# Patient Record
Sex: FEMALE | Race: WHITE | NOT HISPANIC OR LATINO | URBAN - METROPOLITAN AREA
[De-identification: names, ages, dates, MRNs, and addresses within clinical notes are randomized per-mention and may not be internally consistent; named-entity substitution may affect disease eponyms.]

---

## 2017-01-03 ENCOUNTER — OUTPATIENT (OUTPATIENT)
Dept: OUTPATIENT SERVICES | Facility: HOSPITAL | Age: 52
LOS: 1 days | Discharge: HOME | End: 2017-01-03

## 2017-06-27 DIAGNOSIS — R31.9 HEMATURIA, UNSPECIFIED: ICD-10-CM

## 2018-03-12 ENCOUNTER — LABORATORY RESULT (OUTPATIENT)
Age: 53
End: 2018-03-12

## 2018-03-13 ENCOUNTER — APPOINTMENT (OUTPATIENT)
Dept: OBGYN | Facility: CLINIC | Age: 53
End: 2018-03-13
Payer: COMMERCIAL

## 2018-03-13 ENCOUNTER — OUTPATIENT (OUTPATIENT)
Dept: OUTPATIENT SERVICES | Facility: HOSPITAL | Age: 53
LOS: 1 days | Discharge: HOME | End: 2018-03-13

## 2018-03-13 VITALS — HEIGHT: 60 IN | WEIGHT: 165 LBS | BODY MASS INDEX: 32.39 KG/M2

## 2018-03-13 DIAGNOSIS — N63.20 UNSPECIFIED LUMP IN THE LEFT BREAST, UNSPECIFIED QUADRANT: ICD-10-CM

## 2018-03-13 DIAGNOSIS — N95.1 MENOPAUSAL AND FEMALE CLIMACTERIC STATES: ICD-10-CM

## 2018-03-13 DIAGNOSIS — Z01.411 ENCOUNTER FOR GYNECOLOGICAL EXAMINATION (GENERAL) (ROUTINE) WITH ABNORMAL FINDINGS: ICD-10-CM

## 2018-03-13 PROBLEM — Z00.00 ENCOUNTER FOR PREVENTIVE HEALTH EXAMINATION: Status: ACTIVE | Noted: 2018-03-13

## 2018-03-13 LAB
BILIRUB UR QL STRIP: NORMAL
GLUCOSE UR-MCNC: NORMAL
HCG UR QL: NORMAL EU/DL
HGB UR QL STRIP.AUTO: 250
KETONES UR-MCNC: NORMAL
LEUKOCYTE ESTERASE UR QL STRIP: NORMAL
NITRITE UR QL STRIP: NORMAL
PH UR STRIP: 5
PROT UR STRIP-MCNC: NORMAL
SP GR UR STRIP: 1.01

## 2018-03-13 PROCEDURE — 99396 PREV VISIT EST AGE 40-64: CPT

## 2018-03-13 PROCEDURE — 81003 URINALYSIS AUTO W/O SCOPE: CPT | Mod: QW

## 2018-03-18 ENCOUNTER — FORM ENCOUNTER (OUTPATIENT)
Age: 53
End: 2018-03-18

## 2018-03-19 ENCOUNTER — OUTPATIENT (OUTPATIENT)
Dept: OUTPATIENT SERVICES | Facility: HOSPITAL | Age: 53
LOS: 1 days | Discharge: HOME | End: 2018-03-19

## 2018-03-19 DIAGNOSIS — N63.20 UNSPECIFIED LUMP IN THE LEFT BREAST, UNSPECIFIED QUADRANT: ICD-10-CM

## 2018-04-07 ENCOUNTER — APPOINTMENT (OUTPATIENT)
Dept: OBGYN | Facility: CLINIC | Age: 53
End: 2018-04-07
Payer: COMMERCIAL

## 2018-04-07 PROCEDURE — 76856 US EXAM PELVIC COMPLETE: CPT

## 2018-04-07 PROCEDURE — 76830 TRANSVAGINAL US NON-OB: CPT

## 2018-04-07 PROCEDURE — 77085 DXA BONE DENSITY AXL VRT FX: CPT

## 2019-04-05 ENCOUNTER — FORM ENCOUNTER (OUTPATIENT)
Age: 54
End: 2019-04-05

## 2019-04-06 ENCOUNTER — OUTPATIENT (OUTPATIENT)
Dept: OUTPATIENT SERVICES | Facility: HOSPITAL | Age: 54
LOS: 1 days | Discharge: HOME | End: 2019-04-06
Payer: COMMERCIAL

## 2019-04-06 DIAGNOSIS — Z12.31 ENCOUNTER FOR SCREENING MAMMOGRAM FOR MALIGNANT NEOPLASM OF BREAST: ICD-10-CM

## 2019-04-06 PROCEDURE — 77063 BREAST TOMOSYNTHESIS BI: CPT | Mod: 26

## 2019-04-06 PROCEDURE — 77067 SCR MAMMO BI INCL CAD: CPT | Mod: 26

## 2021-04-12 ENCOUNTER — TRANSCRIPTION ENCOUNTER (OUTPATIENT)
Age: 56
End: 2021-04-12

## 2021-04-18 ENCOUNTER — TRANSCRIPTION ENCOUNTER (OUTPATIENT)
Age: 56
End: 2021-04-18

## 2022-06-27 ENCOUNTER — APPOINTMENT (OUTPATIENT)
Dept: PAIN MANAGEMENT | Facility: CLINIC | Age: 57
End: 2022-06-27

## 2023-01-03 ENCOUNTER — LABORATORY RESULT (OUTPATIENT)
Age: 58
End: 2023-01-03

## 2023-01-03 ENCOUNTER — APPOINTMENT (OUTPATIENT)
Dept: PAIN MANAGEMENT | Facility: CLINIC | Age: 58
End: 2023-01-03
Payer: COMMERCIAL

## 2023-01-03 VITALS
WEIGHT: 165 LBS | SYSTOLIC BLOOD PRESSURE: 165 MMHG | HEART RATE: 72 BPM | DIASTOLIC BLOOD PRESSURE: 99 MMHG | BODY MASS INDEX: 32.39 KG/M2 | HEIGHT: 60 IN

## 2023-01-03 LAB — PM MDA: NEGATIVE

## 2023-01-03 PROCEDURE — 99214 OFFICE O/P EST MOD 30 MIN: CPT

## 2023-01-03 PROCEDURE — 80305 DRUG TEST PRSMV DIR OPT OBS: CPT | Mod: QW

## 2023-01-03 NOTE — PHYSICAL EXAM
[de-identified] : \par 1) Spine - Left facet loading, tenderness over L3-S1 facet with pain on extension 5 degrees.\par \par 2) Neck- Pain on flexion and extension with bilateral paravertebral tenderness at C3 through C6. \par \par 3) RUE- Inspection and palpation shows no misalignment, asymmetry, crepitation, defects, tenderness, masses, effusions. ROM is normal without crepitation or contracture. No instability or subluxation or laxity is noted. No abnormal movements. \par \par 4) LUE- Inspection and palpation shows no misalignment, asymmetry, crepitation, defects, tenderness, masses, effusions . ROM is normal without crepitation or contracture. No instability or subluxation or laxity is noted. No abnormal movements. \par \par 5) RLE- Inspection and palpation shows no misalignment, asymmetry, crepitation, defects, tenderness, masses, effusions. ROM is normal without crepitation or contracture. No instability or subluxation or laxity is noted. No abnormal movements. \par \par 6) LLE- Inspection and palpation shows no misalignment, asymmetry, crepitation, defects, tenderness, masses, effusions. ROM is normal without crepitation or contracture. No instability or subluxation or laxity is noted. No abnormal movements.  \par \par Right Shoulder: Palpation of the shoulder/upper arm is as follows: Tenderness is noted at the AC joint, anterior shoulder, lateral shoulder and posterior shoulder. Range of motion of the shoulder is as follows: Pain with internal rotation and external rotation. \par
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
## 2023-01-03 NOTE — ASSESSMENT
[FreeTextEntry1] : Ms. Nikki Escalante is a 58 yo F who suffers from chronic pain involving the cervical and lumbar regions for which she maintains pain control through the continued use of medial marijuana. Overall there is at least a 30-50% reduction in pain with the prescribed analgesics. The patient denies any adverse side effects due to the medication (sleeping disturbance, constipation, sleepiness, hallucinations and/or urination problems).  She is to continue with the given regimen and has been encouraged to obtain marijuana through the approved channels, for example through the locally approved dispensaries. We will not issue a medical marijuana license for recreational use.\par \par UDS to be completed today. \par \par I have consulted the  registry for the purpose of reviewing the patients controlled substance\par \par I, Ria Arteaga, attest that this documentation has been prepared under the direction and in the presence of Provider Kate Sierra The documentation recorded by the scribe, in my presence, accurately reflects the service I personally performed, and the decisions made by me with my edits as appropriate.\par \par Best Regards, \par Antonio Miles, D.O. \par Diplomat, American Board of Anesthesiology \par Diplomat, American Board of Pain Medicine \par Diplomat, American Board of Pain Management

## 2023-05-08 ENCOUNTER — LABORATORY RESULT (OUTPATIENT)
Age: 58
End: 2023-05-08

## 2023-05-09 ENCOUNTER — APPOINTMENT (OUTPATIENT)
Dept: PAIN MANAGEMENT | Facility: CLINIC | Age: 58
End: 2023-05-09
Payer: OTHER GOVERNMENT

## 2023-05-09 ENCOUNTER — RESULT CHARGE (OUTPATIENT)
Age: 58
End: 2023-05-09

## 2023-05-09 VITALS — WEIGHT: 165 LBS | BODY MASS INDEX: 32.39 KG/M2 | HEIGHT: 60 IN

## 2023-05-09 DIAGNOSIS — Z87.898 PERSONAL HISTORY OF OTHER SPECIFIED CONDITIONS: ICD-10-CM

## 2023-05-09 PROCEDURE — 99214 OFFICE O/P EST MOD 30 MIN: CPT

## 2023-05-09 PROCEDURE — 80305 DRUG TEST PRSMV DIR OPT OBS: CPT | Mod: QW

## 2023-05-11 LAB
AMP / AMPHETAMINE: NEGATIVE
BAR / SECOBARBITAL: NEGATIVE
BUP / BUPRENORPHINE: NEGATIVE
BZO / OXAZEPAM: NEGATIVE
COC / COCAINE: NEGATIVE
CREATININE: 50 MG/DL
MDMA / METHYLENEDIOXYMETHAMPHETAMINE: NEGATIVE
MET / METHAMPHETAMINES: NEGATIVE
MOP / MORPHINE: NEGATIVE
MTD / METHADONE: NEGATIVE
OXY / OXYCODONE: NEGATIVE
PCP / PHENCYCLIDINE: NEGATIVE
PH: 5
SPECIFIC GRAVITY: 1.02
TEMPERATURE: 92 C
THC / MARIJUANA: NEGATIVE
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
## 2023-05-11 NOTE — ASSESSMENT
[FreeTextEntry1] : Ms. Nikki Escalante is a 56 yo F who suffers from chronic pain involving the cervical and lumbar regions for which she maintains pain control through the continued use of medial marijuana. She notes that the medication continues to aid in pain relief and more restful sleep. She notes she uses a cream during the day and takes the pill or vapes at night. She wishes to continue with her current regimen. Overall there is at least a 30-50% reduction in pain with the prescribed analgesics. The patient denies any adverse side effects due to the medication (sleeping disturbance, constipation, sleepiness, hallucinations and/or urination problems).  She is to continue with the given regimen and has been encouraged to obtain marijuana through the approved channels, for example through the locally approved dispensaries. \par \par She continues to have cervical and lumbar radicular symptoms. It has been some time since she has had an updated MRI in the past. She has undergone epidural steroid and RFAs in the past with no sustained relief. Cervical and lumbar MRIs were ordered to delineate spine pathology such as disc displacement and stenosis. \par \par UDS to be completed today. \par \par I have consulted the  registry for the purpose of reviewing the patients controlled substance\par \par I, Ria Arteaga, attest that this documentation has been prepared under the direction and in the presence of Provider Kate Sierra The documentation recorded by the scribe, in my presence, accurately reflects the service I personally performed, and the decisions made by me with my edits as appropriate.\par \par Best Regards, \par Antonio Miles D.RE. \par Diplomat, American Board of Anesthesiology \par Diplomat, American Board of Pain Medicine \par Diplomat, American Board of Pain Management

## 2023-05-11 NOTE — PHYSICAL EXAM
[de-identified] : \par 1) Spine - Left facet loading, tenderness over L3-S1 facet with pain on extension 5 degrees.\par \par 2) Neck- Pain on flexion and extension with bilateral paravertebral tenderness at C3 through C6. \par \par 3) RUE- Inspection and palpation shows no misalignment, asymmetry, crepitation, defects, tenderness, masses, effusions. ROM is normal without crepitation or contracture. No instability or subluxation or laxity is noted. No abnormal movements. \par \par 4) LUE- Inspection and palpation shows no misalignment, asymmetry, crepitation, defects, tenderness, masses, effusions . ROM is normal without crepitation or contracture. No instability or subluxation or laxity is noted. No abnormal movements. \par \par 5) RLE- Inspection and palpation shows no misalignment, asymmetry, crepitation, defects, tenderness, masses, effusions. ROM is normal without crepitation or contracture. No instability or subluxation or laxity is noted. No abnormal movements. \par \par 6) LLE- Inspection and palpation shows no misalignment, asymmetry, crepitation, defects, tenderness, masses, effusions. ROM is normal without crepitation or contracture. No instability or subluxation or laxity is noted. No abnormal movements.  \par \par Right Shoulder: Palpation of the shoulder/upper arm is as follows: Tenderness is noted at the AC joint, anterior shoulder, lateral shoulder and posterior shoulder. Range of motion of the shoulder is as follows: Pain with internal rotation and external rotation. \par

## 2023-05-15 LAB
PM 6 MAM: NEGATIVE NG/ML
PM 7-AMINO-CLONAZ: NEGATIVE NG/ML
PM ALPHA-HYDROXY-ALPRAZOLAM: NEGATIVE NG/ML
PM ALPHA-HYDROXY-MIDAZOLAM: NEGATIVE NG/ML
PM ALPRAZOLAM: NEGATIVE NG/ML
PM AMOBARBITAL: NEGATIVE NG/ML
PM AMPHETAMINE INTERP: NEGATIVE
PM AMPHETAMINE: NEGATIVE NG/ML
PM BARBURATES INTERP: NEGATIVE
PM BEG: NEGATIVE NG/ML
PM BENZODIAZEPINES INTERP: NEGATIVE
PM BUPRENORPHINE INTERP: NEGATIVE
PM BUPRENORPHINE: NEGATIVE NG/ML
PM BUTALBITAL: NEGATIVE NG/ML
PM CLONAZEPAM: NEGATIVE NG/ML
PM COCAINE INTERP: NEGATIVE
PM COCAINE: NEGATIVE NG/ML
PM CODIENE: NEGATIVE NG/ML
PM COTININE: 698 NG/ML
PM DIAZEPAM: NEGATIVE NG/ML
PM DIHYROCODEINE: NEGATIVE NG/ML
PM EDDP: NEGATIVE NG/ML
PM FENTANYL INTERP: NEGATIVE NG/ML
PM FENTANYL: NEGATIVE NG/ML
PM FLUNITRAZEPAM: NEGATIVE NG/ML
PM FLURAZEPAM: NEGATIVE NG/ML
PM HYDROCODONE: NEGATIVE NG/ML
PM HYDROMORPHONE: NEGATIVE NG/ML
PM LORAZEPAM: NEGATIVE NG/ML
PM MARIJUANA (DELTA-9-THC): NEGATIVE NG/ML
PM MARIJUANA INTERP: NEGATIVE
PM MDA: NEGATIVE NG/ML
PM MDEA: NEGATIVE NG/ML
PM MDMA: NEGATIVE NG/ML
PM MEPERIDINE: NEGATIVE NG/ML
PM METHADONE INTERP: NEGATIVE
PM METHADONE: NEGATIVE NG/ML
PM METHAMPHETAMINE: NEGATIVE NG/ML
PM MIDAZOLAM: NEGATIVE NG/ML
PM MORPHINE: NEGATIVE NG/ML
PM NALOXONE: NEGATIVE NG/ML
PM NALTREXONE: NEGATIVE NG/ML
PM NICOTINE INTERP: POSITIVE
PM NORBUPRENORPHINE: NEGATIVE NG/ML
PM NORDIAZEPAM: NEGATIVE NG/ML
PM NORMEPERIDINE: NEGATIVE NG/ML
PM NOROXYCODONE: NEGATIVE NG/ML
PM OPIOID INTERP: NEGATIVE
PM OXAZEPAM: NEGATIVE NG/ML
PM OXXYCODONE INTERP: NEGATIVE
PM OXYCODONE: NEGATIVE NG/ML
PM OXYMORPHONE: NEGATIVE NG/ML
PM PCP: NEGATIVE NG/ML
PM PHENCYCLIDINE INTERP: NEGATIVE
PM PHENOBARBITAL: NEGATIVE NG/ML
PM PPX: NEGATIVE NG/ML
PM PROPOXYPHENE INTERP: NEGATIVE
PM SECOBARBITAL: NEGATIVE NG/ML
PM SUFENTANIL: NEGATIVE NG/ML
PM TAPENTADOL: NEGATIVE NG/ML
PM TEMAZEPAM: NEGATIVE NG/ML
PM TRAMADOL INTERP: NEGATIVE
PM TRAMADOL: NEGATIVE NG/ML

## 2023-07-11 ENCOUNTER — APPOINTMENT (OUTPATIENT)
Dept: PAIN MANAGEMENT | Facility: CLINIC | Age: 58
End: 2023-07-11
Payer: OTHER GOVERNMENT

## 2023-07-11 DIAGNOSIS — E03.9 HYPOTHYROIDISM, UNSPECIFIED: ICD-10-CM

## 2023-07-11 PROCEDURE — 99214 OFFICE O/P EST MOD 30 MIN: CPT
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
## 2023-07-11 NOTE — PHYSICAL EXAM
[de-identified] : \par 1) Spine - Left facet loading, tenderness over L3-S1 facet with pain on extension 5 degrees.\par \par 2) Neck- Pain on flexion and extension with bilateral paravertebral tenderness at C3 through C6. \par \par 3) RUE- Inspection and palpation shows no misalignment, asymmetry, crepitation, defects, tenderness, masses, effusions. ROM is normal without crepitation or contracture. No instability or subluxation or laxity is noted. No abnormal movements. \par \par 4) LUE- Inspection and palpation shows no misalignment, asymmetry, crepitation, defects, tenderness, masses, effusions . ROM is normal without crepitation or contracture. No instability or subluxation or laxity is noted. No abnormal movements. \par \par 5) RLE- Inspection and palpation shows no misalignment, asymmetry, crepitation, defects, tenderness, masses, effusions. ROM is normal without crepitation or contracture. No instability or subluxation or laxity is noted. No abnormal movements. \par \par 6) LLE- Inspection and palpation shows no misalignment, asymmetry, crepitation, defects, tenderness, masses, effusions. ROM is normal without crepitation or contracture. No instability or subluxation or laxity is noted. No abnormal movements.  \par \par Right Shoulder: Palpation of the shoulder/upper arm is as follows: Tenderness is noted at the AC joint, anterior shoulder, lateral shoulder and posterior shoulder. Range of motion of the shoulder is as follows: Pain with internal rotation and external rotation. \par

## 2023-07-11 NOTE — ASSESSMENT
[FreeTextEntry1] : Ms. Nikki Escalante is a 56 yo F who suffers from chronic pain involving the cervical and lumbar regions for which she maintains pain control through the continued use of medial marijuana. She notes that the medication continues to aid in pain relief and more restful sleep. She notes she uses a cream during the day and takes the pill or vapes at night. She wishes to continue with her current regimen. Overall there is at least a 30-50% reduction in pain with the prescribed analgesics. The patient denies any adverse side effects due to the medication (sleeping disturbance, constipation, sleepiness, hallucinations and/or urination problems).  She is to continue with the given regimen and has been encouraged to obtain marijuana through the approved channels, for example through the locally approved dispensaries. \par \par She continues to have cervical and lumbar radicular symptoms. It has been some time since she has had an updated MRI. She has undergone epidural steroid and RFAs in the past with no sustained relief. Cervical and lumbar MRIs were ordered to delineate spine pathology such as disc displacement and stenosis. \par \par UDS from 3/9/23- consistent \par \par I have consulted the  registry for the purpose of reviewing the patients controlled substance\par \par I, Ria Arteaga, attest that this documentation has been prepared under the direction and in the presence of Provider Kate Sierra The documentation recorded by the scribe, in my presence, accurately reflects the service I personally performed, and the decisions made by me with my edits as appropriate.\par \par Best Regards, \par Antonio Miles D.RE. \par Diplomat, American Board of Anesthesiology \par Diplomat, American Board of Pain Medicine \par Diplomat, American Board of Pain Management

## 2023-08-29 ENCOUNTER — APPOINTMENT (OUTPATIENT)
Dept: PAIN MANAGEMENT | Facility: CLINIC | Age: 58
End: 2023-08-29
Payer: OTHER GOVERNMENT

## 2023-08-29 PROCEDURE — 99213 OFFICE O/P EST LOW 20 MIN: CPT

## 2023-08-29 NOTE — ASSESSMENT
[FreeTextEntry1] : Ms. Nikki Escalante is a 58 yo F who suffers from chronic pain involving the cervical and lumbar regions for which she maintains pain control through the continued use of medial marijuana. She notes that the medication continues to aid in pain relief and more restful sleep. She notes she uses a cream during the day and takes the pill or vapes at night. She wishes to continue with her current regimen. Overall there is at least a 30-50% reduction in pain with the prescribed analgesics. The patient denies any adverse side effects due to the medication (sleeping disturbance, constipation, sleepiness, hallucinations and/or urination problems).  She is to continue with the given regimen and has been encouraged to obtain marijuana through the approved channels, for example through the locally approved dispensaries. Her MRI of the lumbar spine was denied again by her insurance.   She had a cervical MRI which was reviewed in detail during today's encounter. Patient had a MRI that shows a radicular component along with pain referred into the upper extremity. Patient has trialed rehab (Home  exercise, physical therapy or chiropractic care) and medications.  I will schedule a cervical epidural steroid  1-3 depending on effectiveness. Risk, benefits, pros and cons of procedure were explained to the patient using models and diagrams and their questions were answered.   The patient has severe anxiety of procedures that necessitates monitored anesthesia care (MAC). The procedure performed will be close to major nerves, arteries, and spinal cord and/or joint structures. Due to the proximity of these structures, we need the patient to be still during the procedure.  With the help of MAC, this will be safely achieved and decrease the risk of any complications.  UDS from 3/9/23- consistent   I have consulted the  registry for the purpose of reviewing the patients controlled substance.  I, Ria Arteaga, attest that this documentation has been prepared under the direction and in the presence of Provider Antonio Miles, DO The documentation recorded by the scribe, in my presence, accurately reflects the service I personally performed, and the decisions made by me with my edits as appropriate.  Best Regards,  LOWELL Case.RE.  Diplomat, American Board of Anesthesiology  Diplomat, American Board of Pain Medicine  Diplomat, American Board of Pain Management

## 2023-08-29 NOTE — PHYSICAL EXAM
[de-identified] : \par  1) Spine - Left facet loading, tenderness over L3-S1 facet with pain on extension 5 degrees.\par  \par  2) Neck- Pain on flexion and extension with bilateral paravertebral tenderness at C3 through C6. \par  \par  3) RUE- Inspection and palpation shows no misalignment, asymmetry, crepitation, defects, tenderness, masses, effusions. ROM is normal without crepitation or contracture. No instability or subluxation or laxity is noted. No abnormal movements. \par  \par  4) LUE- Inspection and palpation shows no misalignment, asymmetry, crepitation, defects, tenderness, masses, effusions . ROM is normal without crepitation or contracture. No instability or subluxation or laxity is noted. No abnormal movements. \par  \par  5) RLE- Inspection and palpation shows no misalignment, asymmetry, crepitation, defects, tenderness, masses, effusions. ROM is normal without crepitation or contracture. No instability or subluxation or laxity is noted. No abnormal movements. \par  \par  6) LLE- Inspection and palpation shows no misalignment, asymmetry, crepitation, defects, tenderness, masses, effusions. ROM is normal without crepitation or contracture. No instability or subluxation or laxity is noted. No abnormal movements.  \par  \par  Right Shoulder: Palpation of the shoulder/upper arm is as follows: Tenderness is noted at the AC joint, anterior shoulder, lateral shoulder and posterior shoulder. Range of motion of the shoulder is as follows: Pain with internal rotation and external rotation. \par
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
## 2023-09-18 ENCOUNTER — APPOINTMENT (OUTPATIENT)
Dept: PAIN MANAGEMENT | Facility: CLINIC | Age: 58
End: 2023-09-18
Payer: MEDICAID

## 2023-09-18 PROCEDURE — 93770 DETERMINATION VENOUS PRESS: CPT

## 2023-09-18 PROCEDURE — 00600 ANES PX CRV SPINE&CORD NOS: CPT | Mod: QZ,P2

## 2023-09-18 PROCEDURE — 93040 RHYTHM ECG WITH REPORT: CPT | Mod: 79

## 2023-09-18 PROCEDURE — 94761 N-INVAS EAR/PLS OXIMETRY MLT: CPT

## 2023-09-18 PROCEDURE — 62321 NJX INTERLAMINAR CRV/THRC: CPT

## 2023-10-02 ENCOUNTER — APPOINTMENT (OUTPATIENT)
Dept: PAIN MANAGEMENT | Facility: CLINIC | Age: 58
End: 2023-10-02

## 2023-11-07 ENCOUNTER — LABORATORY RESULT (OUTPATIENT)
Age: 58
End: 2023-11-07

## 2023-11-07 ENCOUNTER — APPOINTMENT (OUTPATIENT)
Dept: PAIN MANAGEMENT | Facility: CLINIC | Age: 58
End: 2023-11-07
Payer: MEDICAID

## 2023-11-07 VITALS
HEIGHT: 60 IN | WEIGHT: 187 LBS | BODY MASS INDEX: 36.71 KG/M2 | DIASTOLIC BLOOD PRESSURE: 82 MMHG | SYSTOLIC BLOOD PRESSURE: 128 MMHG | HEART RATE: 64 BPM

## 2023-11-07 PROCEDURE — 99214 OFFICE O/P EST MOD 30 MIN: CPT

## 2023-11-07 PROCEDURE — 80305 DRUG TEST PRSMV DIR OPT OBS: CPT | Mod: QW

## 2023-11-13 LAB
PM 6 MAM: NEGATIVE NG/ML
PM 7-AMINO-CLONAZ: NEGATIVE NG/ML
PM ALPHA-HYDROXY-ALPRAZOLAM: NEGATIVE NG/ML
PM ALPHA-HYDROXY-MIDAZOLAM: NEGATIVE NG/ML
PM ALPRAZOLAM: NEGATIVE NG/ML
PM AMOBARBITAL: NEGATIVE NG/ML
PM AMPHETAMINE INTERP: NEGATIVE
PM AMPHETAMINE: NEGATIVE NG/ML
PM BARBURATES INTERP: NEGATIVE
PM BEG: NEGATIVE NG/ML
PM BENZODIAZEPINES INTERP: NEGATIVE
PM BUPRENORPHINE INTERP: NEGATIVE
PM BUPRENORPHINE: NEGATIVE NG/ML
PM BUTALBITAL: NEGATIVE NG/ML
PM CLONAZEPAM: NEGATIVE NG/ML
PM COCAINE INTERP: NEGATIVE
PM COCAINE: NEGATIVE NG/ML
PM CODIENE: NEGATIVE NG/ML
PM COTININE: 731 NG/ML
PM DIAZEPAM: NEGATIVE NG/ML
PM DIHYROCODEINE: NEGATIVE NG/ML
PM EDDP: NEGATIVE NG/ML
PM FENTANYL INTERP: NEGATIVE
PM FENTANYL: NEGATIVE NG/ML
PM FLUNITRAZEPAM: NEGATIVE NG/ML
PM FLURAZEPAM: NEGATIVE NG/ML
PM HYDROCODONE: NEGATIVE NG/ML
PM HYDROMORPHONE: NEGATIVE NG/ML
PM LORAZEPAM: NEGATIVE NG/ML
PM MARIJUANA (DELTA-9-THC): NEGATIVE NG/ML
PM MARIJUANA INTERP: NEGATIVE
PM MDA: NEGATIVE NG/ML
PM MDEA: NEGATIVE NG/ML
PM MDMA: NEGATIVE NG/ML
PM MEPERIDINE: NEGATIVE NG/ML
PM METHADONE INTERP: NEGATIVE
PM METHADONE: NEGATIVE NG/ML
PM METHAMPHETAMINE: NEGATIVE NG/ML
PM MIDAZOLAM: NEGATIVE NG/ML
PM MORPHINE: NEGATIVE NG/ML
PM NALOXONE: NEGATIVE NG/ML
PM NALTREXONE: NEGATIVE NG/ML
PM NICOTINE INTERP: POSITIVE
PM NORBUPRENORPHINE: NEGATIVE NG/ML
PM NORDIAZEPAM: NEGATIVE NG/ML
PM NORFENTANYL: NEGATIVE NG/ML
PM NORMEPERIDINE: NEGATIVE NG/ML
PM NOROXYCODONE: NEGATIVE NG/ML
PM OPIOID INTERP: NEGATIVE
PM OXAZEPAM: NEGATIVE NG/ML
PM OXXYCODONE INTERP: NEGATIVE
PM OXYCODONE: NEGATIVE NG/ML
PM OXYMORPHONE: NEGATIVE NG/ML
PM PCP: NEGATIVE NG/ML
PM PHENCYCLIDINE INTERP: NEGATIVE
PM PHENOBARBITAL: NEGATIVE NG/ML
PM PPX: NEGATIVE NG/ML
PM PROPOXYPHENE INTERP: NEGATIVE
PM SECOBARBITAL: NEGATIVE NG/ML
PM SUFENTANIL: NEGATIVE NG/ML
PM TAPENTADOL: NEGATIVE NG/ML
PM TEMAZEPAM: NEGATIVE NG/ML
PM TRAMADOL INTERP: NEGATIVE
PM TRAMADOL: NEGATIVE NG/ML

## 2023-11-16 ENCOUNTER — APPOINTMENT (OUTPATIENT)
Dept: PAIN MANAGEMENT | Facility: CLINIC | Age: 58
End: 2023-11-16

## 2023-11-27 ENCOUNTER — APPOINTMENT (OUTPATIENT)
Dept: PAIN MANAGEMENT | Facility: CLINIC | Age: 58
End: 2023-11-27

## 2024-01-11 ENCOUNTER — APPOINTMENT (OUTPATIENT)
Dept: PAIN MANAGEMENT | Facility: CLINIC | Age: 59
End: 2024-01-11
Payer: MEDICAID

## 2024-01-11 DIAGNOSIS — G89.29 OTHER CHRONIC PAIN: ICD-10-CM

## 2024-01-11 PROCEDURE — 99215 OFFICE O/P EST HI 40 MIN: CPT

## 2024-01-11 NOTE — DATA REVIEWED
[FreeTextEntry1] : MRI of the cervical spine dated 7/29/2023 demonstrates mild central disc protrusion at C3-4.  Broad-based disc/osteophyte complexes at C4-5 and C C6-7 producing marked anterior thecal sac effacement without cord deformity.  Moderate left and mild right neural foraminal narrowing is also seen at the C6-7 level.  Mild disc bulging and mild bilateral neural foraminal narrowing at C5-6.  UDS: No data obtained today.  [place] REGISTRY: Checked.

## 2024-01-11 NOTE — PHYSICAL EXAM
[place] [facility]  [address] [address]  Phone: [phone]  Fax: [phone]                  Ad Lyons   2017 10:00 AM   Office Visit    Description:  Male : 1956   Provider:  Jimmie Carbajal MD   Department:  [place] [facility]           Reason for Visit     Transitional Care           Diagnoses this Visit        Comments    Choledocholithiasis    -  Primary     Hepatitis         Sepsis due to Escherichia coli         Upper respiratory tract infection, unspecified type                To Do List           Future Appointments        Provider Department Dept Phone    2017 2:00 PM LAB, [facility] - [facility] [phone]    2017 3:20 PM Dejuan Tyler MD [facility] Gastroenterology [phone]    2/3/2017 9:00 AM [facility], INT MED [facility] [phone]      Goals (5 Years of Data)     None       These Medications        Disp Refills Start End    doxycycline (VIBRA-TABS) 100 MG tablet 20 tablet 0 2017    Take 1 tablet (100 mg total) by mouth 2 (two) times daily. - Oral    Pharmacy: [facility]/pharmacy #[id number] - [place] - [address] Ph #: [phone]         [facility] On Call     [facility] On Call Nurse Care Line -  Assistance  Registered nurses in the [facility] provide clinical advisement, health education, appointment booking, and other advisory services.  Call for this free service at [phone].             Medications           Message regarding Medications     Verify the changes and/or additions to your medication regime listed below are the same as discussed with your clinician today.  If any of these changes or additions are incorrect, please notify your healthcare provider.        START taking these NEW medications        Refills    doxycycline (VIBRA-TABS) 100 MG tablet 0    Sig: Take 1 tablet (100 mg total) by mouth 2 (two) times daily.    Class: Normal     "Route: Oral      STOP taking these medications     baclofen (LIORESAL) 10 MG tablet Take 1 tablet (10 mg total) by mouth 3 (three) times daily.    Lactobacillus acidoph-L.bulgar 1 million cell Chew Take 4 tablets by mouth 3 (three) times daily with meals.    mometasone (NASONEX) 50 mcg/actuation nasal spray 2 sprays by Nasal route once daily.           Verify that the below list of medications is an accurate representation of the medications you are currently taking.  If none reported, the list may be blank. If incorrect, please contact your healthcare provider. Carry this list with you in case of emergency.           Current Medications     ciprofloxacin HCl (CIPRO) 750 MG tablet Take 1 tablet (750 mg total) by mouth every 12 (twelve) hours.    doxycycline (VIBRA-TABS) 100 MG tablet Take 1 tablet (100 mg total) by mouth 2 (two) times daily.    fluticasone (FLONASE) 50 mcg/actuation nasal spray 4 sprays by Nasal route daily as needed.     L.ACID/L.CASEI/B.BIF/B.KWAKU/FOS (PROBIOTIC BLEND ORAL) Take 1 capsule by mouth 2 (two) times daily.    meclizine (ANTIVERT) 25 mg tablet Take 1 tablet (25 mg total) by mouth 4 (four) times daily.    omeprazole (PRILOSEC) 40 MG capsule Take 1 capsule (40 mg total) by mouth once daily.           Clinical Reference Information           Vital Signs - Last Recorded  Most recent update: 1/6/2017 10:18 AM by Kacy Valenzuela MA    BP Pulse Temp Resp Ht Wt    113/76 (BP Location: Right arm, Patient Position: Sitting, BP Method: Automatic) 80 98 °F (36.7 °C) (Oral) (!) 24 6' 2" (1.88 m) 109.1 kg (240 lb 8.4 oz)    SpO2 BMI             96% 30.88 kg/m2         Blood Pressure          Most Recent Value    BP  113/76      Allergies as of 1/6/2017     Aspirin      Immunizations Administered on Date of Encounter - 1/6/2017     None      Orders Placed During Today's Visit     Future Labs/Procedures Expected by Expires    CBC auto differential  1/6/2017 1/7/2018    Hepatic function panel  1/6/2017 " 1/7/2018      Instructions      Walking for Fitness  Fitness walking has something for everyone, even people who are already fit. Walking is one of the safest ways to condition your body aerobically. It can boost energy, help you lose weight, and reduce stress.    Physical benefits  · Walking strengthens your heart and lungs, and tones your muscles.  · When walking, your feet land with less impact than in other sports. This reduces chances of muscle, bone, and joint injury.  · Regular walking improves your cholesterol levels and lowers your risk of heart disease. And it helps you control your blood sugar if you have diabetes.  · Walking is a weight-bearing activity, which helps maintain bone density. This can help prevent osteoporosis.  Personal rewards  · Taking walks can help you relax and manage stress. And fitness walking may make you feel better about yourself.  · Walking can help you sleep better at night and make you less likely to be depressed.  · Regular walking may help maintain your memory as you get older.  · Walking is a great way to spend extra time with friends and family members. Be sure to invite your dog along!  Q&A about fitness walking  Q: Will walking keep me fit?  A: Yes. Regular walking at the right pace gives you all the benefits of other aerobic activities, such as jogging and swimming.  Q: Will walking help me lose weight and keep it off?  A: Yes. Per mile, walking can burn as many calories as jogging. Your health care provider can help work walking into your weight-loss plan.  Q: Is walking safe for my health?  A: Yes. Walking is safe if you have high blood pressure, diabetes, heart disease, or other conditions. Talk to your health care provider before you start.  © 0331-9682 [employer]. [address] [place] [id number]. All rights reserved. This information is not intended as a substitute for professional medical care. Always follow your healthcare professional's  instructions.              [de-identified] : \par  1) Spine - Left facet loading, tenderness over L3-S1 facet with pain on extension 5 degrees.\par  \par  2) Neck- Pain on flexion and extension with bilateral paravertebral tenderness at C3 through C6. \par  \par  3) RUE- Inspection and palpation shows no misalignment, asymmetry, crepitation, defects, tenderness, masses, effusions. ROM is normal without crepitation or contracture. No instability or subluxation or laxity is noted. No abnormal movements. \par  \par  4) LUE- Inspection and palpation shows no misalignment, asymmetry, crepitation, defects, tenderness, masses, effusions . ROM is normal without crepitation or contracture. No instability or subluxation or laxity is noted. No abnormal movements. \par  \par  5) RLE- Inspection and palpation shows no misalignment, asymmetry, crepitation, defects, tenderness, masses, effusions. ROM is normal without crepitation or contracture. No instability or subluxation or laxity is noted. No abnormal movements. \par  \par  6) LLE- Inspection and palpation shows no misalignment, asymmetry, crepitation, defects, tenderness, masses, effusions. ROM is normal without crepitation or contracture. No instability or subluxation or laxity is noted. No abnormal movements.  \par  \par  Right Shoulder: Palpation of the shoulder/upper arm is as follows: Tenderness is noted at the AC joint, anterior shoulder, lateral shoulder and posterior shoulder. Range of motion of the shoulder is as follows: Pain with internal rotation and external rotation. \par
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
## 2024-01-11 NOTE — ASSESSMENT
[FreeTextEntry1] : Ms. Nikki Escalante is a 58-year-old female who suffers from chronic pain involving the cervical and lumbar regions. She was previously medically managed in our office on medical cannabis but was made aware that we will no longer be participating in the program. She voices understanding. She notes that her neck pain is her chief complaint. It is described as severe and makes it difficult for her to perform her ADLs.  Imaging studies as well as physical exam findings corroborate the symptomatology. We will proceed with a ALPESH x1 w/ mac with the hope it provides her relief of her symptoms. Patient will follow up afterwards for reassessment. All this patients questions were answered and the conversation was understood well.  Patient had a MRI that shows a radicular component along with pain referred into the upper extremity. Patient has trialed rehab (Home exercise, physical therapy or chiropractic care) and medications. I will schedule a cervical epidural steroid 1-3 depending on effectiveness.  The patient has severe anxiety of procedures that necessitates monitored anesthesia care (MAC). The procedure performed will be close to major nerves, arteries, and spinal cord and/or joint structures. Due to the proximity of these structures, we need the patient to be still during the procedure. With the help of MAC, this will be safely achieved and decrease the risk of any complications.  RISK AND BENEFIT PARAGRAPH: Risk, benefits, pros and cons of procedure were explained to the patient using models and diagrams and their questions were answered.  I, Manuela Petit, attest that this documentation has been prepared under the direction and in the presence of Provider Tia Marte MD.   Thank you for allowing me to assist in the management of this patient.    Best Regards,    Tia Marte M.D., FAAPMR   Diplomate, American Board of Physical Medicine and Rehabilitation Diplomate, American Board of Pain Medicine  Diplomate, American Board of Pain Management

## 2024-02-06 ENCOUNTER — APPOINTMENT (OUTPATIENT)
Dept: PAIN MANAGEMENT | Facility: CLINIC | Age: 59
End: 2024-02-06

## 2024-03-12 ENCOUNTER — APPOINTMENT (OUTPATIENT)
Dept: PAIN MANAGEMENT | Facility: CLINIC | Age: 59
End: 2024-03-12

## 2024-04-18 ENCOUNTER — APPOINTMENT (OUTPATIENT)
Dept: PAIN MANAGEMENT | Facility: CLINIC | Age: 59
End: 2024-04-18
Payer: MEDICARE

## 2024-04-18 PROCEDURE — 99213 OFFICE O/P EST LOW 20 MIN: CPT

## 2024-04-18 NOTE — ASSESSMENT
[FreeTextEntry1] : Ms. Nikki Escalante is a 58-year-old female who suffers from chronic pain involving the cervical and lumbar regions. She notes that her neck pain is her chief complaint. It is described as severe and makes it difficult for her to perform her ADLs.  Imaging studies as well as physical exam findings corroborate the symptomatology. We will proceed with a ALPESH x1 w/ mac with the hope it provides her relief of her symptoms. Patient will follow up afterwards for reassessment. All this patients questions were answered and the conversation was understood well.  Patient had a MRI that shows a radicular component along with pain referred into the upper extremity. Patient has trialed rehab (Home exercise, physical therapy or chiropractic care) and medications. I will schedule a cervical epidural steroid 1-3 depending on effectiveness.  The patient has severe anxiety of procedures that necessitates monitored anesthesia care (MAC). The procedure performed will be close to major nerves, arteries, and spinal cord and/or joint structures. Due to the proximity of these structures, we need the patient to be still during the procedure. With the help of MAC, this will be safely achieved and decrease the risk of any complications.  RISK AND BENEFIT PARAGRAPH: Risk, benefits, pros and cons of procedure were explained to the patient using models and diagrams and their questions were answered.  I, Manuela Petit, attest that this documentation has been prepared under the direction and in the presence of Provider Tia Marte MD.   Thank you for allowing me to assist in the management of this patient.    Best Regards,    Tia Marte M.D., FAAPMR   Diplomate, American Board of Physical Medicine and Rehabilitation Diplomate, American Board of Pain Medicine  Diplomate, American Board of Pain Management

## 2024-04-18 NOTE — PHYSICAL EXAM
[de-identified] : \par  1) Spine - Left facet loading, tenderness over L3-S1 facet with pain on extension 5 degrees.\par  \par  2) Neck- Pain on flexion and extension with bilateral paravertebral tenderness at C3 through C6. \par  \par  3) RUE- Inspection and palpation shows no misalignment, asymmetry, crepitation, defects, tenderness, masses, effusions. ROM is normal without crepitation or contracture. No instability or subluxation or laxity is noted. No abnormal movements. \par  \par  4) LUE- Inspection and palpation shows no misalignment, asymmetry, crepitation, defects, tenderness, masses, effusions . ROM is normal without crepitation or contracture. No instability or subluxation or laxity is noted. No abnormal movements. \par  \par  5) RLE- Inspection and palpation shows no misalignment, asymmetry, crepitation, defects, tenderness, masses, effusions. ROM is normal without crepitation or contracture. No instability or subluxation or laxity is noted. No abnormal movements. \par  \par  6) LLE- Inspection and palpation shows no misalignment, asymmetry, crepitation, defects, tenderness, masses, effusions. ROM is normal without crepitation or contracture. No instability or subluxation or laxity is noted. No abnormal movements.  \par  \par  Right Shoulder: Palpation of the shoulder/upper arm is as follows: Tenderness is noted at the AC joint, anterior shoulder, lateral shoulder and posterior shoulder. Range of motion of the shoulder is as follows: Pain with internal rotation and external rotation. \par
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
## 2024-04-23 ENCOUNTER — APPOINTMENT (OUTPATIENT)
Dept: PAIN MANAGEMENT | Facility: CLINIC | Age: 59
End: 2024-04-23
Payer: MEDICARE

## 2024-04-23 DIAGNOSIS — M54.12 RADICULOPATHY, CERVICAL REGION: ICD-10-CM

## 2024-04-23 PROCEDURE — 93040 RHYTHM ECG WITH REPORT: CPT | Mod: 79

## 2024-04-23 PROCEDURE — 00600 ANES PX CRV SPINE&CORD NOS: CPT | Mod: QZ,P2

## 2024-04-23 PROCEDURE — 94761 N-INVAS EAR/PLS OXIMETRY MLT: CPT

## 2024-04-23 PROCEDURE — 93770 DETERMINATION VENOUS PRESS: CPT

## 2024-04-23 PROCEDURE — 62321 NJX INTERLAMINAR CRV/THRC: CPT

## 2024-04-23 NOTE — PROCEDURE
[FreeTextEntry3] :  CERVICAL EPIDURAL STEROID INJECTION UNDER FLUORSCOPY   Date:  2024  Patient: Nikki Escalante  :  1965   Preoperative Diagnosis: Cervical radiculopathy  Postoperative Diagnosis: Cervical radiculopathy  Procedure: Translaminar Cervical Epidural Injection under fluoroscopy  Physician: Tia Marte MD, FAAPMR  Anesthesiologist/CRNA: Mr. Jimenez  Anesthesia: See nurses note. MAC/Cold spray. Versed 2 mg, Fentanyl 50 mcg IVP     Medical Necessity:  Failure of conservative management.     CONSENT: The possible complications including infection, bleeding, nerve damage, hospital admission, death or failure of the procedure; though unusual, are theoretically possible. The patient was educated about the of the procedure and alternative therapies. All questions were answered and the patient freely gave consent to proceed.  Indication for Fluoroscopy:  This procedure requires the precise placement of the spinal needle.  It is the only way to accurately and safely perform the injection.   Monitoring:  Patient had continuous blood pressure, EKG, and pulse oximetry throughout the case. See nurse's notes.     After obtaining written consent, the After obtaining written consent, the patient was positioned prone on the fluoroscopy table. The back to her neck and upper thorax was prepped with Betadine and draped in usual sterile fashion. A time out was performed. The C7-T1 interspace was identified using fluoroscopy. The skin was infiltrated with lidocaine 2% -- 1 cc for subcutaneous analgesia.  The epidural space was identified using a 18g touhy needle with a midline approach using a loss of resistance technique. 2cc omnipaque was used to define the space. A solution of 5 ml of preservative-free sterile saline and 1ml of Methylprednisolone 80mg, 1cc was infused with minimal pressure on the syringe into the epidural space. The procedure was tolerated well. There was no evidence of CSF, Paresthesias nor heme. The needle was removed intact. A band aid was place on the site.     Epidurogram:  No signs of epidural fibrosis.    Complications: None. The patient tolerated the procedure well.     Disposition: I have examined the patient and there are no new physical findings since the original presentation.  Sensory and motor function were intact. The patient met discharge criteria see nurses notes. The discharge instruction sheet was reviewed and given to the patient. The patient was discharged home with a .  If patient gets sustained relief will have patient do shoulder griddle strengthening with Thera bands and walking.     Comment: 1st ALPESH today, schedule 2nd in 1-2 weeks depending of effectiveness vs follow up in office depending on the insurance. Call if any problems.     This document was electronically signed by:     Tia Marte MD, FAAPMR Diplomate, American Board of Physical Medicine and Rehabilitation Diplomate, American Board of Pain Medicine

## 2024-05-09 ENCOUNTER — APPOINTMENT (OUTPATIENT)
Dept: PAIN MANAGEMENT | Facility: CLINIC | Age: 59
End: 2024-05-09
Payer: MEDICARE

## 2024-05-09 DIAGNOSIS — M48.02 SPINAL STENOSIS, CERVICAL REGION: ICD-10-CM

## 2024-05-09 PROCEDURE — 99213 OFFICE O/P EST LOW 20 MIN: CPT
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
## 2024-05-09 NOTE — ASSESSMENT
[FreeTextEntry1] : Ms. Nikki Escalante is a 58-year-old female who suffers from chronic pain involving the cervical and lumbar regions. She notes that her neck pain is her chief complaint. It is described as severe and makes it difficult for her to perform her ADLs. She is s/p a ALPESH x1 w/ mac 4/23/24 which provided her with 65-70% relief.  She continues to experience stiffness in the neck which impacts her range of motion.  We will submit for a repeat ALPESH x1 w/ mac and she will follow up afterwards. All this patients questions were answered and the conversation was understood well.  Patient had a MRI that shows a radicular component along with pain referred into the upper extremity. Patient has trialed rehab (Home exercise, physical therapy or chiropractic care) and medications. I will schedule a cervical epidural steroid 1-3 depending on effectiveness.  The patient has severe anxiety of procedures that necessitates monitored anesthesia care (MAC). The procedure performed will be close to major nerves, arteries, and spinal cord and/or joint structures. Due to the proximity of these structures, we need the patient to be still during the procedure. With the help of MAC, this will be safely achieved and decrease the risk of any complications.  RISK AND BENEFIT PARAGRAPH: Risk, benefits, pros and cons of procedure were explained to the patient using models and diagrams and their questions were answered.  I, Manuela Petit, attest that this documentation has been prepared under the direction and in the presence of Provider Tia Marte MD.   Thank you for allowing me to assist in the management of this patient.    Best Regards,    Tia Marte M.D., FAAPMR   Diplomate, American Board of Physical Medicine and Rehabilitation Diplomate, American Board of Pain Medicine  Diplomate, American Board of Pain Management

## 2024-05-09 NOTE — PHYSICAL EXAM
[de-identified] : \par  1) Spine - Left facet loading, tenderness over L3-S1 facet with pain on extension 5 degrees.\par  \par  2) Neck- Pain on flexion and extension with bilateral paravertebral tenderness at C3 through C6. \par  \par  3) RUE- Inspection and palpation shows no misalignment, asymmetry, crepitation, defects, tenderness, masses, effusions. ROM is normal without crepitation or contracture. No instability or subluxation or laxity is noted. No abnormal movements. \par  \par  4) LUE- Inspection and palpation shows no misalignment, asymmetry, crepitation, defects, tenderness, masses, effusions . ROM is normal without crepitation or contracture. No instability or subluxation or laxity is noted. No abnormal movements. \par  \par  5) RLE- Inspection and palpation shows no misalignment, asymmetry, crepitation, defects, tenderness, masses, effusions. ROM is normal without crepitation or contracture. No instability or subluxation or laxity is noted. No abnormal movements. \par  \par  6) LLE- Inspection and palpation shows no misalignment, asymmetry, crepitation, defects, tenderness, masses, effusions. ROM is normal without crepitation or contracture. No instability or subluxation or laxity is noted. No abnormal movements.  \par  \par  Right Shoulder: Palpation of the shoulder/upper arm is as follows: Tenderness is noted at the AC joint, anterior shoulder, lateral shoulder and posterior shoulder. Range of motion of the shoulder is as follows: Pain with internal rotation and external rotation. \par

## 2024-05-21 ENCOUNTER — APPOINTMENT (OUTPATIENT)
Dept: PAIN MANAGEMENT | Facility: CLINIC | Age: 59
End: 2024-05-21
Payer: MEDICARE

## 2024-05-21 PROCEDURE — 94761 N-INVAS EAR/PLS OXIMETRY MLT: CPT

## 2024-05-21 PROCEDURE — 62321 NJX INTERLAMINAR CRV/THRC: CPT

## 2024-05-21 PROCEDURE — 00600 ANES PX CRV SPINE&CORD NOS: CPT | Mod: QZ,P2

## 2024-05-21 PROCEDURE — 93040 RHYTHM ECG WITH REPORT: CPT | Mod: 79

## 2024-05-21 PROCEDURE — 93770 DETERMINATION VENOUS PRESS: CPT

## 2024-05-21 NOTE — PROCEDURE
[FreeTextEntry3] : CERVICAL EPIDURAL STEROID INJECTION UNDER FLUORSCOPY   Date:  2024  Patient: Nikki Escalante  :  1965   Preoperative Diagnosis: Cervical radiculopathy  Postoperative Diagnosis: Cervical radiculopathy  Procedure: Translaminar Cervical Epidural Injection under fluoroscopy  Physician: Tia Marte MD, FAAPMR  Anesthesiologist/CRNA: Ms. Panjasson  Anesthesia: See nurses note. MAC/Cold spray. Versed 2 mg, Fentanyl 100 mcg IVP  Medical Necessity:  Failure of conservative management.  CONSENT: The possible complications including infection, bleeding, nerve damage, hospital admission, death or failure of the procedure; though unusual, are theoretically possible. The patient was educated about the of the procedure and alternative therapies. All questions were answered and the patient freely gave consent to proceed.  Indication for Fluoroscopy:  This procedure requires the precise placement of the spinal needle.  It is the only way to accurately and safely perform the injection.   Monitoring:  Patient had continuous blood pressure, EKG, and pulse oximetry throughout the case. See nurse's notes.     After obtaining written consent, the After obtaining written consent, the patient was positioned prone on the fluoroscopy table. The back to her neck and upper thorax was prepped with Betadine and draped in usual sterile fashion. A time out was performed. The C7-T1 interspace was identified using fluoroscopy. The skin was infiltrated with lidocaine 2% -- 1 cc for subcutaneous analgesia.  The epidural space was identified using a 18g touhy needle with a midline approach using a loss of resistance technique. 2cc omnipaque was used to define the space. A solution of 5 ml of preservative-free sterile saline and 1ml of Methylprednisolone 80mg, 1cc was infused with minimal pressure on the syringe into the epidural space. The procedure was tolerated well. There was no evidence of CSF, Paresthesias nor heme. The needle was removed intact. A band aid was place on the site.     Epidurogram:  No signs of epidural fibrosis.    Complications: None. The patient tolerated the procedure well.     Disposition: I have examined the patient and there are no new physical findings since the original presentation.  Sensory and motor function were intact. The patient met discharge criteria see nurses notes. The discharge instruction sheet was reviewed and given to the patient. The patient was discharged home with a .  If patient gets sustained relief will have patient do shoulder griddle strengthening with Thera bands and walking.     Comment: 2nd ALPESH today, schedule 3rd in 1-2 weeks depending of effectiveness vs follow up in office depending on the insurance. Call if any problems.     This document was electronically signed by:     Tia Marte MD, FAAPMR Diplomate, American Board of Physical Medicine and Rehabilitation Diplomate, American Board of Pain Medicine

## 2024-06-12 ENCOUNTER — APPOINTMENT (OUTPATIENT)
Dept: PAIN MANAGEMENT | Facility: CLINIC | Age: 59
End: 2024-06-12
Payer: MEDICARE

## 2024-06-12 DIAGNOSIS — M54.16 RADICULOPATHY, LUMBAR REGION: ICD-10-CM

## 2024-06-12 DIAGNOSIS — M54.2 CERVICALGIA: ICD-10-CM

## 2024-06-12 DIAGNOSIS — M54.12 RADICULOPATHY, CERVICAL REGION: ICD-10-CM

## 2024-06-12 PROCEDURE — 99213 OFFICE O/P EST LOW 20 MIN: CPT
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
## 2024-06-12 NOTE — PHYSICAL EXAM
[de-identified] : \par  1) Spine - Left facet loading, tenderness over L3-S1 facet with pain on extension 5 degrees.\par  \par  2) Neck- Pain on flexion and extension with bilateral paravertebral tenderness at C3 through C6. \par  \par  3) RUE- Inspection and palpation shows no misalignment, asymmetry, crepitation, defects, tenderness, masses, effusions. ROM is normal without crepitation or contracture. No instability or subluxation or laxity is noted. No abnormal movements. \par  \par  4) LUE- Inspection and palpation shows no misalignment, asymmetry, crepitation, defects, tenderness, masses, effusions . ROM is normal without crepitation or contracture. No instability or subluxation or laxity is noted. No abnormal movements. \par  \par  5) RLE- Inspection and palpation shows no misalignment, asymmetry, crepitation, defects, tenderness, masses, effusions. ROM is normal without crepitation or contracture. No instability or subluxation or laxity is noted. No abnormal movements. \par  \par  6) LLE- Inspection and palpation shows no misalignment, asymmetry, crepitation, defects, tenderness, masses, effusions. ROM is normal without crepitation or contracture. No instability or subluxation or laxity is noted. No abnormal movements.  \par  \par  Right Shoulder: Palpation of the shoulder/upper arm is as follows: Tenderness is noted at the AC joint, anterior shoulder, lateral shoulder and posterior shoulder. Range of motion of the shoulder is as follows: Pain with internal rotation and external rotation. \par

## 2024-06-12 NOTE — ASSESSMENT
[FreeTextEntry1] : Ms. Nikki Escalante is a 58-year-old female who suffers from chronic pain involving the cervical and lumbar regions. She notes that her neck pain is her chief complaint. It is described as severe and makes it difficult for her to perform her ADLs. She is s/p a repeat ALPESH x1 w/ mac on 5/21/24 which provided her with 75% relief. There was overall improvement in functionality and ability to perform her ADLs. Her lower back pain has progressed recently.  She wishes to hold off on further injection treatment at this time as she is undergoing a right shoulder replacement in the near future.  She will call to make an appointment when she begins to feel better after surgery. All this patients questions were answered and the conversation was understood well.   I, Manuela Petit, attest that this documentation has been prepared under the direction and in the presence of Provider Tia Marte MD.   Thank you for allowing me to assist in the management of this patient.    Best Regards,    Tia Marte M.D., FAAPMR   Diplomate, American Board of Physical Medicine and Rehabilitation Diplomate, American Board of Pain Medicine  Diplomate, American [employer] of Pain Management

## 2025-01-20 ENCOUNTER — OUTPATIENT (OUTPATIENT)
Dept: OUTPATIENT SERVICES | Facility: HOSPITAL | Age: 60
LOS: 1 days | End: 2025-01-20
Payer: MEDICARE

## 2025-01-20 DIAGNOSIS — Z12.31 ENCOUNTER FOR SCREENING MAMMOGRAM FOR MALIGNANT NEOPLASM OF BREAST: ICD-10-CM

## 2025-01-20 PROCEDURE — 77067 SCR MAMMO BI INCL CAD: CPT

## 2025-01-20 PROCEDURE — 77063 BREAST TOMOSYNTHESIS BI: CPT

## 2025-01-20 PROCEDURE — 77063 BREAST TOMOSYNTHESIS BI: CPT | Mod: 26

## 2025-01-20 PROCEDURE — 77067 SCR MAMMO BI INCL CAD: CPT | Mod: 26

## 2025-01-21 DIAGNOSIS — Z12.31 ENCOUNTER FOR SCREENING MAMMOGRAM FOR MALIGNANT NEOPLASM OF BREAST: ICD-10-CM
